# Patient Record
Sex: MALE | Race: WHITE | NOT HISPANIC OR LATINO
[De-identification: names, ages, dates, MRNs, and addresses within clinical notes are randomized per-mention and may not be internally consistent; named-entity substitution may affect disease eponyms.]

---

## 2024-04-05 ENCOUNTER — APPOINTMENT (OUTPATIENT)
Dept: OTOLARYNGOLOGY | Facility: CLINIC | Age: 29
End: 2024-04-05
Payer: COMMERCIAL

## 2024-04-05 ENCOUNTER — NON-APPOINTMENT (OUTPATIENT)
Age: 29
End: 2024-04-05

## 2024-04-05 PROBLEM — Z00.00 ENCOUNTER FOR PREVENTIVE HEALTH EXAMINATION: Status: ACTIVE | Noted: 2024-04-05

## 2024-04-05 PROCEDURE — 31231 NASAL ENDOSCOPY DX: CPT

## 2024-04-05 PROCEDURE — 99204 OFFICE O/P NEW MOD 45 MIN: CPT | Mod: 25

## 2024-04-05 PROCEDURE — 99203 OFFICE O/P NEW LOW 30 MIN: CPT | Mod: 25

## 2024-04-05 RX ORDER — AZELASTINE HYDROCHLORIDE AND FLUTICASONE PROPIONATE 137; 50 UG/1; UG/1
137-50 SPRAY, METERED NASAL
Qty: 1 | Refills: 4 | Status: ACTIVE | COMMUNITY
Start: 2024-04-05 | End: 1900-01-01

## 2024-04-05 RX ORDER — LEVOCETIRIZINE DIHYDROCHLORIDE 5 MG/1
5 TABLET ORAL DAILY
Qty: 1 | Refills: 3 | Status: ACTIVE | COMMUNITY
Start: 2024-04-05 | End: 1900-01-01

## 2024-04-05 RX ORDER — MONTELUKAST 10 MG/1
10 TABLET, FILM COATED ORAL DAILY
Qty: 30 | Refills: 6 | Status: ACTIVE | COMMUNITY
Start: 2024-04-05 | End: 1900-01-01

## 2024-04-05 NOTE — HISTORY OF PRESENT ILLNESS
[de-identified] : 28M here complaining of trouble breathing out of his nose. Has been going on his whole life. Says he has to breathe out of his mouth sometimes because its difficult to breathe from his nose. +seasonal allergies. Has not used any nasal sprays or other treatments. Pt ahs dry mouth in the morning. Pt has not been on meds.

## 2024-05-10 ENCOUNTER — APPOINTMENT (OUTPATIENT)
Dept: OTOLARYNGOLOGY | Facility: CLINIC | Age: 29
End: 2024-05-10
Payer: COMMERCIAL

## 2024-05-10 DIAGNOSIS — J34.89 OTHER SPECIFIED DISORDERS OF NOSE AND NASAL SINUSES: ICD-10-CM

## 2024-05-10 DIAGNOSIS — J30.89 OTHER ALLERGIC RHINITIS: ICD-10-CM

## 2024-05-10 DIAGNOSIS — M95.0 ACQUIRED DEFORMITY OF NOSE: ICD-10-CM

## 2024-05-10 PROCEDURE — 99214 OFFICE O/P EST MOD 30 MIN: CPT | Mod: 25

## 2024-05-10 PROCEDURE — 31231 NASAL ENDOSCOPY DX: CPT

## 2024-05-10 NOTE — PROCEDURE
[Anatomical Abnormality] : anatomical abnormality [Anterior rhinoscopy insufficient to account for symptoms] : anterior rhinoscopy insufficient to account for symptoms [None] : none [Rigid Endoscope] : examined with a rigid endoscope [Congested] : congested [Ale] : ale [Normal] : the middle meatus had no abnormalities [FreeTextEntry2] : wavy septum, no significal septal spur

## 2024-05-10 NOTE — ASSESSMENT
[FreeTextEntry1] : I personally reviewed, interpreted and discussed patient's CT images. mildly deviated septum without blockage at the septal level.  I had a long discussion with the patient and his wife. I explained the difference between a septoplasty and a rhinoplasty.   Since the patient is mainly complaining of a nasal congestion and right nasal wall collapse, I advised him to consider a conservative procedure in the office under local anesthesia.  Patient will see Dr Hendrix to schedule procedure.

## 2024-05-10 NOTE — HISTORY OF PRESENT ILLNESS
[FreeTextEntry1] : Patient presents today for c/o trouble breathing from the nose. This has been ongoing his entire life. He is a mouth breathing due to being restricted. He had a CT scan done and would like to discuss. Did not try nasal sprays, states he tried it in the past with only temporary relief. Did not take montelukast and levo. DId not try breathe rite strip. Gets steroid injection from PMD every 6-12 months with improvement. No further complaints.

## 2024-05-10 NOTE — REASON FOR VISIT
[Subsequent Evaluation] : a subsequent evaluation for [FreeTextEntry2] : trouble breathing from the nose.

## 2025-04-12 NOTE — PHYSICAL EXAM
H&P reviewed   Patient examined   Ceftriaxone is appropriate   Discussed imaging findings with patient    [Nasal Endoscopy Performed] : nasal endoscopy was performed, see procedure section for findings [Normal] : mucosa is normal [Midline] : trachea located in midline position [de-identified] : nasal valve collapse, improvement with Carbon maneuver and lateralization [de-identified] : edema  [de-identified] : thickened